# Patient Record
Sex: FEMALE | Race: OTHER | NOT HISPANIC OR LATINO
[De-identification: names, ages, dates, MRNs, and addresses within clinical notes are randomized per-mention and may not be internally consistent; named-entity substitution may affect disease eponyms.]

---

## 2019-03-20 ENCOUNTER — TRANSCRIPTION ENCOUNTER (OUTPATIENT)
Age: 54
End: 2019-03-20

## 2019-03-20 ENCOUNTER — EMERGENCY (EMERGENCY)
Facility: HOSPITAL | Age: 54
LOS: 1 days | Discharge: HOME | End: 2019-03-20
Attending: EMERGENCY MEDICINE | Admitting: EMERGENCY MEDICINE
Payer: COMMERCIAL

## 2019-03-20 VITALS
DIASTOLIC BLOOD PRESSURE: 90 MMHG | HEART RATE: 69 BPM | SYSTOLIC BLOOD PRESSURE: 185 MMHG | OXYGEN SATURATION: 99 % | RESPIRATION RATE: 16 BRPM

## 2019-03-20 VITALS
DIASTOLIC BLOOD PRESSURE: 137 MMHG | SYSTOLIC BLOOD PRESSURE: 203 MMHG | OXYGEN SATURATION: 99 % | TEMPERATURE: 98 F | HEART RATE: 83 BPM | WEIGHT: 203.93 LBS | RESPIRATION RATE: 18 BRPM | HEIGHT: 62 IN

## 2019-03-20 DIAGNOSIS — Z79.1 LONG TERM (CURRENT) USE OF NON-STEROIDAL ANTI-INFLAMMATORIES (NSAID): ICD-10-CM

## 2019-03-20 DIAGNOSIS — I10 ESSENTIAL (PRIMARY) HYPERTENSION: ICD-10-CM

## 2019-03-20 DIAGNOSIS — M25.552 PAIN IN LEFT HIP: ICD-10-CM

## 2019-03-20 DIAGNOSIS — Z79.899 OTHER LONG TERM (CURRENT) DRUG THERAPY: ICD-10-CM

## 2019-03-20 DIAGNOSIS — Z79.891 LONG TERM (CURRENT) USE OF OPIATE ANALGESIC: ICD-10-CM

## 2019-03-20 PROCEDURE — 99284 EMERGENCY DEPT VISIT MOD MDM: CPT | Mod: 25

## 2019-03-20 PROCEDURE — 96372 THER/PROPH/DIAG INJ SC/IM: CPT

## 2019-03-20 PROCEDURE — 73502 X-RAY EXAM HIP UNI 2-3 VIEWS: CPT

## 2019-03-20 PROCEDURE — 99283 EMERGENCY DEPT VISIT LOW MDM: CPT

## 2019-03-20 PROCEDURE — 73502 X-RAY EXAM HIP UNI 2-3 VIEWS: CPT | Mod: 26,LT

## 2019-03-20 RX ORDER — LOSARTAN POTASSIUM 100 MG/1
100 TABLET, FILM COATED ORAL ONCE
Qty: 0 | Refills: 0 | Status: COMPLETED | OUTPATIENT
Start: 2019-03-20 | End: 2019-03-20

## 2019-03-20 RX ORDER — OXYCODONE HYDROCHLORIDE 5 MG/1
1 TABLET ORAL
Qty: 12 | Refills: 0 | OUTPATIENT
Start: 2019-03-20 | End: 2019-03-22

## 2019-03-20 RX ORDER — HYDROCHLOROTHIAZIDE 25 MG
25 TABLET ORAL ONCE
Qty: 0 | Refills: 0 | Status: COMPLETED | OUTPATIENT
Start: 2019-03-20 | End: 2019-03-20

## 2019-03-20 RX ORDER — KETOROLAC TROMETHAMINE 30 MG/ML
30 SYRINGE (ML) INJECTION ONCE
Qty: 0 | Refills: 0 | Status: DISCONTINUED | OUTPATIENT
Start: 2019-03-20 | End: 2019-03-20

## 2019-03-20 RX ORDER — OXYCODONE AND ACETAMINOPHEN 5; 325 MG/1; MG/1
1 TABLET ORAL ONCE
Qty: 0 | Refills: 0 | Status: DISCONTINUED | OUTPATIENT
Start: 2019-03-20 | End: 2019-03-20

## 2019-03-20 RX ADMIN — LOSARTAN POTASSIUM 100 MILLIGRAM(S): 100 TABLET, FILM COATED ORAL at 11:58

## 2019-03-20 RX ADMIN — Medication 25 MILLIGRAM(S): at 11:58

## 2019-03-20 RX ADMIN — Medication 30 MILLIGRAM(S): at 11:18

## 2019-03-20 NOTE — ED PROVIDER NOTE - NSFOLLOWUPINSTRUCTIONS_ED_ALL_ED_FT
follow up with your orthopedist in the next 5 days    Hip Pain  The hip is the joint between the upper legs and the lower pelvis. The bones, cartilage, tendons, and muscles of your hip joint support your body and allow you to move around.    ImageHip pain can range from a minor ache to severe pain in one or both of your hips. The pain may be felt on the inside of the hip joint near the groin, or the outside near the buttocks and upper thigh. You may also have swelling or stiffness.    Follow these instructions at home:  Managing pain, stiffness, and swelling     If directed, apply ice to the injured area.    Put ice in a plastic bag.  Place a towel between your skin and the bag.  Leave the ice on for 20 minutes, 2–3 times a day    Sleep with a pillow between your legs on your most comfortable side.  Avoid any activities that cause pain.  General instructions     Take over-the-counter and prescription medicines only as told by your health care provider.  Do any exercises as told by your health care provider.  Record the following:    How often you have hip pain.  The location of your pain.  What the pain feels like.  What makes the pain worse.    Keep all follow-up visits as told by your health care provider. This is important.  Contact a health care provider if:  You cannot put weight on your leg.  Your pain or swelling continues or gets worse after one week.  It gets harder to walk.  You have a fever.  Get help right away if:  You fall.  You have a sudden increase in pain and swelling in your hip.  Your hip is red or swollen or very tender to touch.  Summary  Hip pain can range from a minor ache to severe pain in one or both of your hips.  The pain may be felt on the inside of the hip joint near the groin, or the outside near the buttocks and upper thigh.  Avoid any activities that cause pain.  Record how often you have hip pain, the location of the pain, what makes it worse and what it feels like.  This information is not intended to replace advice given to you by your health care provider. Make sure you discuss any questions you have with your health care provider.    Document Released: 06/07/2011 Document Revised: 11/20/2017 Document Reviewed: 11/20/2017  [employer] Patient Education © 2019 [employer]

## 2019-03-20 NOTE — ED ADULT TRIAGE NOTE - CHIEF COMPLAINT QUOTE
pt c/o left hip pain x 4 weeks. pt reports having left knee problems and f/u with orthopedics, receiving a cortisone shot and since then pain radiated to the hip, was unable to see her regular orthopedics and pain is not being relieved by Hydrocodone. denies falls or recent injuries. Ambulating straight without assistance, pt noted hypertensive on triage, reports not taking her medications this morning. ekg done

## 2019-03-20 NOTE — ED ADULT NURSE NOTE - OBJECTIVE STATEMENT
55 y/o female c/o L hip pain x 4 weeks s/p cortisone shot for L knee pain. pt hypertensive on arrival and denies taking BP medication this morning. 53 y/o female c/o L hip pain x 4 weeks s/p cortisone shot for L knee pain. pt hypertensive on arrival and denies taking BP medication this morning or last night. pt normally takes losartan 100mg and hydrochlorothiazide. denies headache, dizziness, vision changes, falls or injury. ambulating independently with steady gait.

## 2019-03-20 NOTE — ED PROVIDER NOTE - CLINICAL SUMMARY MEDICAL DECISION MAKING FREE TEXT BOX
53 yo female with hx of arthritis in left knee, now with pain in left hip for a few weeks  no trauma  xray normal  pt will fu with ortho  pt will also fu pmd for her htn, says it usually runs high, difficult to control

## 2019-03-20 NOTE — ED PROVIDER NOTE - OBJECTIVE STATEMENT
pain over left hip for a few weeks  hx of left knee pain, sees an orthopedist who drains her knee once a month  she is eventually going to need a knee replacment  no trauma, no fall  did not take her htn meds today

## 2019-04-18 ENCOUNTER — APPOINTMENT (OUTPATIENT)
Dept: ORTHOPEDIC SURGERY | Facility: CLINIC | Age: 54
End: 2019-04-18
Payer: COMMERCIAL

## 2019-04-18 VITALS — WEIGHT: 197 LBS | HEIGHT: 62 IN | BODY MASS INDEX: 36.25 KG/M2

## 2019-04-18 DIAGNOSIS — S83.249A OTHER TEAR OF MEDIAL MENISCUS, CURRENT INJURY, UNSPECIFIED KNEE, INITIAL ENCOUNTER: ICD-10-CM

## 2019-04-18 PROCEDURE — 20611 DRAIN/INJ JOINT/BURSA W/US: CPT | Mod: LT

## 2019-04-18 PROCEDURE — 99214 OFFICE O/P EST MOD 30 MIN: CPT | Mod: 25

## 2019-04-18 RX ORDER — DICLOFENAC SODIUM 75 MG/1
75 TABLET, DELAYED RELEASE ORAL TWICE DAILY
Qty: 60 | Refills: 2 | Status: ACTIVE | COMMUNITY
Start: 2019-04-18 | End: 1900-01-01

## 2019-04-18 NOTE — PROCEDURE
[de-identified] : Patient has demonstrated limited relief from NSAIDS, rest, exercises / PT , and after discussion of the risks and benefits, the patient has elected to proceed with a\par n ULTRASOUND GUIDED corticosteroid injection into the LEFT SupeLat PF Knee\par  \par Confirmed that the patient does not have history of prior adverse reactions, active, infections, or relevant allergies. There was no effusion, erythema, or warmth, and the skin was clear\par \par The skin was sterilized with alcohol. Ethyl Chloride was used as a topical anesthetic. Routine sterile technique. \par  \par The KNEE JOINT  was injected utilizing ULTRASOUND GUIDANCEwith KENALOG + LIDOCAINE. The injection was completed without complication and a bandage was applied.\par \par The patient tolerated the procedure well and was given post-injection instructions.Rec: Cold therapy, analgesics, avoid heavy activity.\par \par MEDICATION: Lidocaine 1% 4cc + 10mg of Kenalog\par \par EFFUSION ASPIRATED: 25cc CLEAR YELLOW \par

## 2019-04-18 NOTE — PHYSICAL EXAM
[Slightly Antalgic] : slightly antalgic [Knee Swelling Left] : swelling [Knee Motion Left Crepitus] : crepitus with ROM [Knee Tenderness On Palpation Left] : tenderness [Knee Tender On Palp With Quadriceps Contracted (Shrug Sign)] : positive patellar grind [Patellofemoral Apprehension Test Left] : negative patellofemoral apprehension test [Knee Anterior Drawer Sign Left] : negative anterior drawer sign [Knee Instability Laxity Left Anterior Cruciate Ligament] : negative Lachman's test [Knee Medial Instability Left] : no laxity on valgus stress

## 2019-04-18 NOTE — HISTORY OF PRESENT ILLNESS
[Pain Location] : pain [] : left leg [___ mths] : [unfilled] month(s) ago [10] : a current pain level of 10/10 [Walking] : worsened by walking [Ice] : relieved by ice [de-identified] : FOLLOW UP\par LEFT KNEE\par FLARE UP FEBRUARY 2019\par PAIN LEVEL 10/10\par CONSTANT PAIN\par SHARP, THROBBING, CRAMPING, STABBING\par BETTER AFTER KNEE DRAIN\par WORSE WHEN WALKING\par SWELLING, STIFFNESS\par PREVIOUS CORTISONE INJECTION JANUARY 2019 HELPED TEMPORARY

## 2020-06-03 ENCOUNTER — APPOINTMENT (OUTPATIENT)
Dept: ORTHOPEDIC SURGERY | Facility: CLINIC | Age: 55
End: 2020-06-03

## 2020-06-11 ENCOUNTER — APPOINTMENT (OUTPATIENT)
Dept: ORTHOPEDIC SURGERY | Facility: CLINIC | Age: 55
End: 2020-06-11
Payer: COMMERCIAL

## 2020-06-11 VITALS — TEMPERATURE: 94.8 F

## 2020-06-11 DIAGNOSIS — M25.561 PAIN IN RIGHT KNEE: ICD-10-CM

## 2020-06-11 PROCEDURE — 20611 DRAIN/INJ JOINT/BURSA W/US: CPT | Mod: 50

## 2020-06-11 PROCEDURE — 99213 OFFICE O/P EST LOW 20 MIN: CPT | Mod: 25

## 2020-06-11 NOTE — DISCUSSION/SUMMARY
[de-identified] : POST INJECTION INSTRUCTIONS\par \par COLD THERAPY , ANALGESICS PRN\par \par HOME STRETCHING AND EXERCISES QD\par \par S=

## 2020-06-11 NOTE — PROCEDURE
[de-identified] : INJECTION / ASPIRATION BILATERAL KNEES\par \par Patient has demonstrated limited relief from NSAIDS, rest, exercises / PT , and after discussion of the risks and benefits, the patient has elected to proceed with a\par n ULTRASOUND GUIDED corticosteroid injection into the BILATERAL SupeLat PF Knee\par  \par Confirmed that the patient does not have history of prior adverse reactions, active, infections, or relevant allergies. There was no effusion, erythema, or warmth, and the skin was clear\par \par The skin was sterilized with alcohol. Ethyl Chloride was used as a topical anesthetic. Routine sterile technique. \par  \par The KNEE JOINT  was injected utilizing ULTRASOUND GUIDANCEwith KENALOG + LIDOCAINE. The injection was completed without complication and a bandage was applied.\par \par The patient tolerated the procedure well and was given post-injection instructions.Rec: Cold therapy, analgesics, avoid heavy activity.\par \par MEDICATION: Lidocaine 1% 4cc + 10mg of Kenalog\par

## 2020-06-11 NOTE — HISTORY OF PRESENT ILLNESS
[de-identified] : FOLLOW UP\par LEFT KNEE\par WORSE SINCE MARCH \par PAIN LEVEL 10/10\par SWELLING\par PREVIOUS CORTISONE INJECTION  APRIL HELPFUL FOR ABOUT 1 YEAR

## 2020-06-11 NOTE — PHYSICAL EXAM
[Knee Swelling Left] : swelling [Slightly Antalgic] : slightly antalgic [Knee Tenderness On Palpation Left] : tenderness [Knee Motion Left Crepitus] : crepitus with ROM [Knee Tender On Palp With Quadriceps Contracted (Shrug Sign)] : positive patellar grind [Knee Anterior Drawer Sign Left] : negative anterior drawer sign [Patellofemoral Apprehension Test Left] : negative patellofemoral apprehension test [Knee Instability Laxity Left Anterior Cruciate Ligament] : negative Lachman's test [Knee Medial Instability Left] : no laxity on valgus stress [de-identified] : PHYSICAL EXAM LEFT KNEE\par \par AROM\par EXTENSION = 0\par FLEXION = 130 \par \par SPECIAL TESTS\par \par PATELLAR GRIND = NEG\par DRAWER  = NEG\par LACHMAN = NEG\par MACMURRAY = NEG \par \par MOTOR = GROSSLY INTACT\par SENSORY = GROSSLY INTACT \par \par \par

## 2021-01-20 ENCOUNTER — APPOINTMENT (OUTPATIENT)
Dept: ORTHOPEDIC SURGERY | Facility: CLINIC | Age: 56
End: 2021-01-20
Payer: COMMERCIAL

## 2021-01-20 PROCEDURE — 20611 DRAIN/INJ JOINT/BURSA W/US: CPT | Mod: LT

## 2021-01-20 PROCEDURE — 99072 ADDL SUPL MATRL&STAF TM PHE: CPT

## 2021-01-20 PROCEDURE — 99213 OFFICE O/P EST LOW 20 MIN: CPT | Mod: 25

## 2021-01-20 NOTE — PHYSICAL EXAM
[Slightly Antalgic] : slightly antalgic [Knee Swelling Left] : swelling [Knee Tenderness On Palpation Left] : tenderness [Knee Motion Left Crepitus] : crepitus with ROM [Knee Tender On Palp With Quadriceps Contracted (Shrug Sign)] : positive patellar grind [Knee Anterior Drawer Sign Left] : negative anterior drawer sign [Patellofemoral Apprehension Test Left] : negative patellofemoral apprehension test [Knee Instability Laxity Left Anterior Cruciate Ligament] : negative Lachman's test [Knee Medial Instability Left] : no laxity on valgus stress [de-identified] : PHYSICAL EXAM LEFT KNEE\par \par AROM\par EXTENSION = 0\par FLEXION = 130 \par \par SPECIAL TESTS\par \par PATELLAR GRIND = NEG\par DRAWER  = NEG\par LACHMAN = NEG\par MACMURRAY = NEG \par \par MOTOR = GROSSLY INTACT\par SENSORY = GROSSLY INTACT \par \par \par

## 2021-01-20 NOTE — HISTORY OF PRESENT ILLNESS
[de-identified] : LEFT KNEE\par FOLLOW UP\par FLARE UP 1 MONTH AGO\par PAIN LEVEL 10/10\par STIFFNESS\par WEAKNESS\par SWELLING\par CORTISONE INJECTION 6/11/2020- HELPFUL FOR ABOUT 6 MONTHS

## 2021-01-27 DIAGNOSIS — F41.9 ANXIETY DISORDER, UNSPECIFIED: ICD-10-CM

## 2021-03-08 ENCOUNTER — RX RENEWAL (OUTPATIENT)
Age: 56
End: 2021-03-08

## 2021-04-22 ENCOUNTER — APPOINTMENT (OUTPATIENT)
Dept: ORTHOPEDIC SURGERY | Facility: CLINIC | Age: 56
End: 2021-04-22
Payer: COMMERCIAL

## 2021-04-22 DIAGNOSIS — S83.242A OTHER TEAR OF MEDIAL MENISCUS, CURRENT INJURY, LEFT KNEE, INITIAL ENCOUNTER: ICD-10-CM

## 2021-04-22 DIAGNOSIS — M25.562 PAIN IN LEFT KNEE: ICD-10-CM

## 2021-04-22 PROCEDURE — 20611 DRAIN/INJ JOINT/BURSA W/US: CPT | Mod: LT

## 2021-04-22 PROCEDURE — 99072 ADDL SUPL MATRL&STAF TM PHE: CPT

## 2021-04-22 PROCEDURE — 99213 OFFICE O/P EST LOW 20 MIN: CPT | Mod: 25

## 2021-04-22 RX ORDER — ALPRAZOLAM 0.5 MG/1
0.5 TABLET ORAL
Qty: 5 | Refills: 0 | Status: ACTIVE | COMMUNITY
Start: 2021-01-27 | End: 1900-01-01

## 2021-04-22 NOTE — PHYSICAL EXAM
[Slightly Antalgic] : slightly antalgic [Knee Swelling Left] : swelling [Knee Tenderness On Palpation Left] : tenderness [Knee Motion Left Crepitus] : crepitus with ROM [Knee Tender On Palp With Quadriceps Contracted (Shrug Sign)] : positive patellar grind [Patellofemoral Apprehension Test Left] : negative patellofemoral apprehension test [Knee Anterior Drawer Sign Left] : negative anterior drawer sign [Knee Instability Laxity Left Anterior Cruciate Ligament] : negative Lachman's test [Knee Medial Instability Left] : no laxity on valgus stress [de-identified] : PHYSICAL EXAM LEFT KNEE\par \par AROM\par EXTENSION = 0\par FLEXION = 130 \par \par SPECIAL TESTS\par \par PATELLAR GRIND = NEG\par DRAWER  = NEG\par LACHMAN = NEG\par MACMURRAY = NEG \par \par MOTOR = GROSSLY INTACT\par SENSORY = GROSSLY INTACT \par \par \par

## 2021-04-22 NOTE — PROCEDURE
[de-identified] : INJECTION ASPIRATION LEFT KNEE\par \par Patient has demonstrated limited relief from NSAIDS, rest, exercises / PT , and after discussion of the risks and benefits, the patient has elected to proceed with a\par n ULTRASOUND GUIDED corticosteroid injection into the LEFT SupeLat PF Knee\par  \par Confirmed that the patient does not have history of prior adverse reactions, active, infections, or relevant allergies. There was no effusion, erythema, or warmth, and the skin was clear\par \par The skin was sterilized with alcohol. Ethyl Chloride was used as a topical anesthetic. Routine sterile technique. \par  \par The KNEE JOINT was injected utilizing ULTRASOUND GUIDANCEwith KENALOG + LIDOCAINE. The injection was completed without complication and a bandage was applied.\par \par The patient tolerated the procedure well and was given post-injection instructions.Rec: Cold therapy, analgesics, avoid heavy activity.\par \par MEDICATION: Lidocaine 1% 4cc + 10mg of Kenalog\par \par EFFUSION ASPIRATED: NONE

## 2021-04-22 NOTE — DISCUSSION/SUMMARY
[de-identified] : POST INJECTION INSTRUCTIONS\par \par COLD THERAPY , ANALGESICS PRN\par \par HOME STRETCHING AND EXERCISES QD\par \par

## 2021-04-22 NOTE — HISTORY OF PRESENT ILLNESS
[de-identified] : LEFT KNEE CHRONIC PAIN \par FOLLOW UP \par  PAIN LEVEL: 10/10\par SWELLING  \par PREVIOUS CORTISONE INJECTION 01/20/21- HELPFUL FOR 2 MONTHS \par \par

## 2021-06-03 ENCOUNTER — APPOINTMENT (OUTPATIENT)
Dept: ORTHOPEDIC SURGERY | Facility: CLINIC | Age: 56
End: 2021-06-03
Payer: COMMERCIAL

## 2021-06-03 DIAGNOSIS — M25.462 EFFUSION, LEFT KNEE: ICD-10-CM

## 2021-06-03 PROCEDURE — 99213 OFFICE O/P EST LOW 20 MIN: CPT

## 2021-06-03 PROCEDURE — 99072 ADDL SUPL MATRL&STAF TM PHE: CPT

## 2021-06-03 RX ORDER — TRAMADOL HYDROCHLORIDE 50 MG/1
50 TABLET, COATED ORAL
Qty: 90 | Refills: 0 | Status: ACTIVE | COMMUNITY
Start: 2021-06-03 | End: 1900-01-01

## 2021-06-03 NOTE — PHYSICAL EXAM
[Slightly Antalgic] : slightly antalgic [Knee Swelling Left] : swelling [Knee Tenderness On Palpation Left] : tenderness [Knee Motion Left Crepitus] : crepitus with ROM [Knee Tender On Palp With Quadriceps Contracted (Shrug Sign)] : positive patellar grind [Patellofemoral Apprehension Test Left] : negative patellofemoral apprehension test [Knee Anterior Drawer Sign Left] : negative anterior drawer sign [Knee Instability Laxity Left Anterior Cruciate Ligament] : negative Lachman's test [Knee Medial Instability Left] : no laxity on valgus stress [de-identified] : PHYSICAL EXAM LEFT KNEE\par \par AROM\par EXTENSION = 0\par FLEXION = 130 \par \par SPECIAL TESTS\par \par PATELLAR GRIND = NEG\par DRAWER  = NEG\par LACHMAN = NEG\par MACMURRAY = NEG \par \par MOTOR = GROSSLY INTACT\par SENSORY = GROSSLY INTACT \par \par \par

## 2021-06-03 NOTE — HISTORY OF PRESENT ILLNESS
[de-identified] : LEFT KNEE CHRONIC PAIN \par FOLLOW UP \par PAIN LEVEL: 10/10 \par PREVIOUS CORTISONE INJECTION 4/22/21- NOT HELPFUL \par MRI RESULTS TODAY \par \par \par \par

## 2021-07-09 ENCOUNTER — APPOINTMENT (OUTPATIENT)
Dept: ORTHOPEDIC SURGERY | Facility: CLINIC | Age: 56
End: 2021-07-09

## 2022-09-08 ENCOUNTER — RX RENEWAL (OUTPATIENT)
Age: 57
End: 2022-09-08

## 2022-09-08 RX ORDER — NABUMETONE 500 MG/1
500 TABLET, FILM COATED ORAL
Qty: 60 | Refills: 2 | Status: ACTIVE | COMMUNITY
Start: 2021-04-22 | End: 1900-01-01

## 2022-11-10 ENCOUNTER — APPOINTMENT (OUTPATIENT)
Dept: ORTHOPEDIC SURGERY | Facility: CLINIC | Age: 57
End: 2022-11-10

## 2022-11-10 DIAGNOSIS — M17.12 UNILATERAL PRIMARY OSTEOARTHRITIS, LEFT KNEE: ICD-10-CM

## 2022-11-10 PROCEDURE — 99213 OFFICE O/P EST LOW 20 MIN: CPT | Mod: 25

## 2022-11-10 PROCEDURE — 20611 DRAIN/INJ JOINT/BURSA W/US: CPT | Mod: LT

## 2022-11-10 RX ORDER — DICLOFENAC SODIUM 75 MG/1
75 TABLET, DELAYED RELEASE ORAL
Qty: 60 | Refills: 5 | Status: ACTIVE | COMMUNITY
Start: 2020-06-11 | End: 1900-01-01

## 2022-11-10 NOTE — HISTORY OF PRESENT ILLNESS
[de-identified] : LEFT KNEE CHRONIC PAIN \par FOLLOW UP - FLARED UP SUNDAY 11/06/22\par PAIN LEVEL: 10/10\par LAST CORTISONE INJECTION 4/22/21- NOT HELPFUL - LASTED ONE WEEK\par WOULD LIKE A CORTISONE INJECTION TODAY\par

## 2022-11-10 NOTE — PHYSICAL EXAM
[Slightly Antalgic] : slightly antalgic [Knee Swelling Left] : swelling [Knee Tenderness On Palpation Left] : tenderness [Knee Motion Left Crepitus] : crepitus with ROM [Knee Tender On Palp With Quadriceps Contracted (Shrug Sign)] : positive patellar grind [Patellofemoral Apprehension Test Left] : negative patellofemoral apprehension test [Knee Anterior Drawer Sign Left] : negative anterior drawer sign [Knee Instability Laxity Left Anterior Cruciate Ligament] : negative Lachman's test [Knee Medial Instability Left] : no laxity on valgus stress [de-identified] : PHYSICAL EXAM LEFT KNEE\par \par BOGGY SWELLING ANTERIOR\par AROM\par EXTENSION = 0\par FLEXION = 85\par \par SPECIAL TESTS\par \par PATELLAR GRIND = NEG\par DRAWER  = NEG\par LACHMAN = NEG\par MACMURRAY = NEG \par \par MOTOR = GROSSLY INTACT\par SENSORY = GROSSLY INTACT \par \par \par

## 2022-11-10 NOTE — DISCUSSION/SUMMARY
[de-identified] : XRAYS REVEAL KNEE OSTEOARTHRITIS\par LITTLE RELIEF FROM NSAIDS , EXERCISES \par PATIENT HAS ELECTED TO PROCEED WITH KENALOG INJECTION KNEE  \par RISKS AND BENEFITS DISCUSSED - VERBAL CONSENT OBTAINED \par SEE PROCEDURE NOTE\par \par \par POST INJECTION INSTRUCTIONS:\par \par INJECTION THERAPY HANDOUT PROVIDED\par \par COLD THERAPY , ANALGESICS PRN\par \par HOME STRETCHING AND EXERCISES QD  - KNEE OA  HANDOUT PROVIDED, REVIEWED AND DEMONSTRATED \par \par SEE DR DUPONT FOR TKA

## 2022-11-10 NOTE — PROCEDURE
[de-identified] : INJECTION ASPIRATION LEFT KNEE\par \par Patient has demonstrated limited relief from NSAIDS, rest, exercises / PT , and after discussion of the risks and benefits, the patient has elected to proceed with a\par n ULTRASOUND GUIDED corticosteroid injection into the LEFT SupeLat PF Knee\par  \par Confirmed that the patient does not have history of prior adverse reactions, active, infections, or relevant allergies. There was no effusion, erythema, or warmth, and the skin was clear\par \par The skin was sterilized with alcohol. Ethyl Chloride was used as a topical anesthetic. Routine sterile technique. \par  \par The KNEE JOINT  was injected utilizing ULTRASOUND GUIDANCEwith KENALOG + LIDOCAINE. The injection was completed without complication and a bandage was applied.\par \par The patient tolerated the procedure well and was given post-injection instructions.Rec: Cold therapy, analgesics, avoid heavy activity.\par \par MEDICATION: Lidocaine 1% 4cc + 40mg of Kenalog\par \par EFFUSION ASPIRATED: 30CC CLEAR YELLOW\par
